# Patient Record
Sex: FEMALE | Race: WHITE | Employment: FULL TIME | URBAN - METROPOLITAN AREA
[De-identification: names, ages, dates, MRNs, and addresses within clinical notes are randomized per-mention and may not be internally consistent; named-entity substitution may affect disease eponyms.]

---

## 2016-12-08 LAB — STREP GP B CULT OB: NEGATIVE

## 2017-01-11 ENCOUNTER — TELEPHONE (OUTPATIENT)
Dept: OBGYN UNIT | Facility: HOSPITAL | Age: 32
End: 2017-01-11

## 2017-01-12 ENCOUNTER — APPOINTMENT (OUTPATIENT)
Dept: OBGYN CLINIC | Facility: HOSPITAL | Age: 32
End: 2017-01-12
Payer: COMMERCIAL

## 2017-01-12 ENCOUNTER — TELEPHONE (OUTPATIENT)
Dept: OBGYN UNIT | Facility: HOSPITAL | Age: 32
End: 2017-01-12

## 2017-01-13 PROBLEM — O48.0 POST-DATES PREGNANCY: Status: ACTIVE | Noted: 2017-01-13

## 2017-01-13 PROBLEM — O48.0 POST-DATES PREGNANCY (HCC): Status: ACTIVE | Noted: 2017-01-13

## 2017-01-18 ENCOUNTER — TELEPHONE (OUTPATIENT)
Dept: OBGYN UNIT | Facility: HOSPITAL | Age: 32
End: 2017-01-18

## 2017-01-19 ENCOUNTER — TELEPHONE (OUTPATIENT)
Dept: OBGYN UNIT | Facility: HOSPITAL | Age: 32
End: 2017-01-19

## 2017-01-19 ENCOUNTER — NURSE ONLY (OUTPATIENT)
Dept: LACTATION | Facility: HOSPITAL | Age: 32
End: 2017-01-19
Attending: OBSTETRICS & GYNECOLOGY
Payer: COMMERCIAL

## 2017-01-19 VITALS — TEMPERATURE: 98 F

## 2017-01-19 PROCEDURE — 99214 OFFICE O/P EST MOD 30 MIN: CPT

## 2017-01-19 NOTE — PROGRESS NOTES
LACTATION NOTE - MOTHER           Problems identified  Problems identified: Knowledge deficit;Milk supply WNL; Nipple pain  Problems Identified Other:  (EPDS score \"16\"  mood disorder protocol initiated, pt declined further assistances.  see below zoloft and xanax for depresion/anxiety managmement.  Information per Dr. Taurus South provided on both previously mentioned medications to discuss with pediatrician.)  Written Education: Breastfeeding suggestions for home;Engorgement/sore nipple prevention and amada

## 2017-01-19 NOTE — PROGRESS NOTES
REV'D SELF AND INFANT CARE WITH MOM. VERBALIZES UNDERSTANDING OF INSTRUCTIONS REV'D. ENCOURAGED TO FOLLOW-UP WITH MDS AS DIRECTED AND WITH QUESTIONS. PT REPORTS FEELING \"VERY EMOTIONAL AND WEEPY. \" DENIES ANY SUICIDAL OR HOMICIDAL THOUGHTS.  REV'D MENTAL

## 2017-12-22 ENCOUNTER — HOSPITAL (OUTPATIENT)
Dept: OTHER | Age: 32
End: 2017-12-22
Attending: EMERGENCY MEDICINE

## 2018-04-25 ENCOUNTER — HOSPITAL (OUTPATIENT)
Dept: OTHER | Age: 33
End: 2018-04-25
Attending: EMERGENCY MEDICINE

## 2019-02-09 ENCOUNTER — HOSPITAL (OUTPATIENT)
Dept: OTHER | Age: 34
End: 2019-02-09
Attending: EMERGENCY MEDICINE

## 2019-02-09 LAB
APPEARANCE UR: ABNORMAL
APPEARANCE UR: ABNORMAL
BILIRUB UR QL STRIP: NEGATIVE
BILIRUB UR QL STRIP: NEGATIVE
COLOR UR: YELLOW
COLOR UR: YELLOW
GLUCOSE UR STRIP-MCNC: NEGATIVE MG/DL
GLUCOSE UR STRIP-MCNC: NEGATIVE MG/DL
HCG POINT OF CARE (5HGRST): NEGATIVE
HCG POINT OF CARE (5HGRST): NEGATIVE
HEMOCCULT STL QL: NEGATIVE
HEMOCCULT STL QL: NEGATIVE
KETONES UR STRIP-MCNC: NEGATIVE MG/DL
KETONES UR STRIP-MCNC: NEGATIVE MG/DL
LEUKOCYTE ESTERASE UR QL STRIP: NEGATIVE
LEUKOCYTE ESTERASE UR QL STRIP: NEGATIVE
NITRITE UR QL STRIP: NEGATIVE
NITRITE UR QL STRIP: NEGATIVE
PH UR STRIP: 7 UNIT (ref 5–7)
PH UR STRIP: 7 UNITS (ref 5–7)
PROT UR STRIP-MCNC: ABNORMAL MG/DL
PROT UR STRIP-MCNC: ABNORMAL MG/DL
SP GR UR STRIP: 1.02 (ref 1–1.03)
SP GR UR STRIP: 1.02 (ref 1–1.03)
UROBILINOGEN UR STRIP-MCNC: 1 MG/DL (ref 0–1)
UROBILINOGEN UR STRIP-MCNC: 1 MG/DL (ref 0–1)

## 2020-01-28 ENCOUNTER — HOSPITAL (OUTPATIENT)
Dept: OTHER | Age: 35
End: 2020-01-28
Attending: EMERGENCY MEDICINE

## 2021-06-28 PROCEDURE — 88175 CYTOPATH C/V AUTO FLUID REDO: CPT | Performed by: OBSTETRICS & GYNECOLOGY

## 2021-06-28 PROCEDURE — 87624 HPV HI-RISK TYP POOLED RSLT: CPT | Performed by: OBSTETRICS & GYNECOLOGY

## 2021-08-13 ENCOUNTER — LAB ENCOUNTER (OUTPATIENT)
Dept: LAB | Age: 36
End: 2021-08-13
Attending: OBSTETRICS & GYNECOLOGY
Payer: COMMERCIAL

## 2021-08-13 DIAGNOSIS — Z13.220 SCREENING CHOLESTEROL LEVEL: ICD-10-CM

## 2021-08-13 LAB
CHOLEST SMN-MCNC: 176 MG/DL (ref ?–200)
HDLC SERPL-MCNC: 65 MG/DL (ref 40–59)
LDLC SERPL CALC-MCNC: 93 MG/DL (ref ?–100)
NONHDLC SERPL-MCNC: 111 MG/DL (ref ?–130)
PATIENT FASTING Y/N/NP: YES
TRIGL SERPL-MCNC: 103 MG/DL (ref 30–149)
VLDLC SERPL CALC-MCNC: 17 MG/DL (ref 0–30)

## 2021-08-13 PROCEDURE — 80061 LIPID PANEL: CPT

## 2021-08-13 PROCEDURE — 36415 COLL VENOUS BLD VENIPUNCTURE: CPT

## 2021-12-28 ENCOUNTER — WALK IN (OUTPATIENT)
Dept: URGENT CARE | Age: 36
End: 2021-12-28
Attending: EMERGENCY MEDICINE

## 2021-12-28 VITALS
SYSTOLIC BLOOD PRESSURE: 141 MMHG | DIASTOLIC BLOOD PRESSURE: 88 MMHG | TEMPERATURE: 97.1 F | WEIGHT: 265 LBS | HEART RATE: 88 BPM | RESPIRATION RATE: 14 BRPM | OXYGEN SATURATION: 97 %

## 2021-12-28 DIAGNOSIS — J01.01 ACUTE RECURRENT MAXILLARY SINUSITIS: Primary | ICD-10-CM

## 2021-12-28 PROCEDURE — C9803 HOPD COVID-19 SPEC COLLECT: HCPCS

## 2021-12-28 PROCEDURE — U0003 INFECTIOUS AGENT DETECTION BY NUCLEIC ACID (DNA OR RNA); SEVERE ACUTE RESPIRATORY SYNDROME CORONAVIRUS 2 (SARS-COV-2) (CORONAVIRUS DISEASE [COVID-19]), AMPLIFIED PROBE TECHNIQUE, MAKING USE OF HIGH THROUGHPUT TECHNOLOGIES AS DESCRIBED BY CMS-2020-01-R: HCPCS | Performed by: EMERGENCY MEDICINE

## 2021-12-28 PROCEDURE — 99202 OFFICE O/P NEW SF 15 MIN: CPT

## 2021-12-28 RX ORDER — FLUTICASONE PROPIONATE 50 MCG
1 SPRAY, SUSPENSION (ML) NASAL 2 TIMES DAILY
Qty: 16 G | Refills: 12 | Status: SHIPPED | OUTPATIENT
Start: 2021-12-28 | End: 2023-10-11 | Stop reason: ALTCHOICE

## 2021-12-28 ASSESSMENT — PAIN SCALES - GENERAL: PAINLEVEL: 5

## 2021-12-29 LAB
SARS-COV-2 RNA RESP QL NAA+PROBE: NOT DETECTED
SERVICE CMNT-IMP: NORMAL
SERVICE CMNT-IMP: NORMAL

## 2023-05-04 ENCOUNTER — WALK IN (OUTPATIENT)
Dept: URGENT CARE | Age: 38
End: 2023-05-04
Attending: EMERGENCY MEDICINE

## 2023-05-04 VITALS
OXYGEN SATURATION: 98 % | RESPIRATION RATE: 16 BRPM | SYSTOLIC BLOOD PRESSURE: 139 MMHG | TEMPERATURE: 97.9 F | DIASTOLIC BLOOD PRESSURE: 80 MMHG | HEART RATE: 80 BPM

## 2023-05-04 DIAGNOSIS — H66.91 RIGHT OTITIS MEDIA, UNSPECIFIED OTITIS MEDIA TYPE: ICD-10-CM

## 2023-05-04 DIAGNOSIS — K12.2 UVULITIS: Primary | ICD-10-CM

## 2023-05-04 PROCEDURE — 99212 OFFICE O/P EST SF 10 MIN: CPT

## 2023-05-04 RX ORDER — SERTRALINE HYDROCHLORIDE 25 MG/1
25 TABLET, FILM COATED ORAL DAILY
COMMUNITY

## 2023-05-04 RX ORDER — PREDNISONE 20 MG/1
40 TABLET ORAL DAILY
Qty: 10 TABLET | Refills: 0 | Status: SHIPPED | OUTPATIENT
Start: 2023-05-04 | End: 2023-05-09

## 2023-05-04 RX ORDER — AZITHROMYCIN 250 MG/1
TABLET, FILM COATED ORAL
Qty: 6 TABLET | Refills: 0 | Status: SHIPPED | OUTPATIENT
Start: 2023-05-04 | End: 2023-10-11 | Stop reason: ALTCHOICE

## 2023-05-04 RX ORDER — BUPROPION HYDROCHLORIDE 75 MG/1
75 TABLET ORAL 2 TIMES DAILY
COMMUNITY

## 2023-05-04 ASSESSMENT — ENCOUNTER SYMPTOMS
ACTIVITY CHANGE: 0
POLYPHAGIA: 0
POLYDIPSIA: 0
FEVER: 0
HEADACHES: 0
NUMBNESS: 0
SORE THROAT: 0
WOUND: 0
VOMITING: 0
COUGH: 0
EYE DISCHARGE: 0
BRUISES/BLEEDS EASILY: 0
SHORTNESS OF BREATH: 0
CHILLS: 0
EYE PAIN: 0
FACIAL SWELLING: 0
NAUSEA: 0
DIARRHEA: 0
COLOR CHANGE: 0
CONFUSION: 0
BACK PAIN: 0
DIZZINESS: 0
WHEEZING: 0
ABDOMINAL PAIN: 0
EYE REDNESS: 0

## 2023-10-11 ENCOUNTER — WALK IN (OUTPATIENT)
Dept: URGENT CARE | Age: 38
End: 2023-10-11
Attending: EMERGENCY MEDICINE

## 2023-10-11 VITALS
WEIGHT: 260 LBS | SYSTOLIC BLOOD PRESSURE: 153 MMHG | OXYGEN SATURATION: 97 % | HEART RATE: 80 BPM | TEMPERATURE: 98.3 F | DIASTOLIC BLOOD PRESSURE: 80 MMHG | RESPIRATION RATE: 16 BRPM

## 2023-10-11 DIAGNOSIS — J01.90 ACUTE NON-RECURRENT SINUSITIS, UNSPECIFIED LOCATION: Primary | ICD-10-CM

## 2023-10-11 DIAGNOSIS — U07.1 COVID-19 VIRUS INFECTION: ICD-10-CM

## 2023-10-11 PROCEDURE — 99212 OFFICE O/P EST SF 10 MIN: CPT

## 2023-10-11 RX ORDER — AZITHROMYCIN 250 MG/1
TABLET, FILM COATED ORAL
Qty: 6 TABLET | Refills: 0 | Status: SHIPPED | OUTPATIENT
Start: 2023-10-11

## 2023-10-11 RX ORDER — FLUTICASONE PROPIONATE 50 MCG
1 SPRAY, SUSPENSION (ML) NASAL DAILY
Qty: 16 G | Refills: 0 | Status: SHIPPED | OUTPATIENT
Start: 2023-10-11 | End: 2023-11-10

## 2023-10-11 ASSESSMENT — ENCOUNTER SYMPTOMS
WHEEZING: 0
EYE PAIN: 0
SHORTNESS OF BREATH: 0
SINUS PAIN: 1
COLOR CHANGE: 0
NUMBNESS: 0
HEADACHES: 0
EYE REDNESS: 0
EYE DISCHARGE: 0
DIARRHEA: 0
SINUS PRESSURE: 1
WOUND: 0
POLYPHAGIA: 0
BRUISES/BLEEDS EASILY: 0
BACK PAIN: 0
SORE THROAT: 0
ABDOMINAL PAIN: 0
DIZZINESS: 0
COUGH: 1
NAUSEA: 0
CHILLS: 0
VOMITING: 0
POLYDIPSIA: 0
FACIAL SWELLING: 0
ACTIVITY CHANGE: 0
FEVER: 1
CONFUSION: 0

## 2023-10-11 ASSESSMENT — PAIN SCALES - GENERAL
PAINLEVEL_OUTOF10: 5
PAINLEVEL: 5

## 2023-11-22 PROCEDURE — 88175 CYTOPATH C/V AUTO FLUID REDO: CPT

## 2023-11-22 PROCEDURE — 87624 HPV HI-RISK TYP POOLED RSLT: CPT

## 2023-12-20 ENCOUNTER — LAB ENCOUNTER (OUTPATIENT)
Dept: LAB | Facility: HOSPITAL | Age: 38
End: 2023-12-20
Payer: COMMERCIAL

## 2023-12-20 DIAGNOSIS — Z01.818 PRE-OP TESTING: ICD-10-CM

## 2023-12-20 PROCEDURE — 80053 COMPREHEN METABOLIC PANEL: CPT

## 2023-12-20 PROCEDURE — 82306 VITAMIN D 25 HYDROXY: CPT

## 2023-12-20 PROCEDURE — 36415 COLL VENOUS BLD VENIPUNCTURE: CPT

## 2023-12-20 PROCEDURE — 80061 LIPID PANEL: CPT

## 2023-12-20 PROCEDURE — 84443 ASSAY THYROID STIM HORMONE: CPT

## 2023-12-20 PROCEDURE — 83036 HEMOGLOBIN GLYCOSYLATED A1C: CPT

## 2023-12-20 PROCEDURE — 85025 COMPLETE CBC W/AUTO DIFF WBC: CPT

## 2023-12-27 ENCOUNTER — HOSPITAL ENCOUNTER (OUTPATIENT)
Dept: MAMMOGRAPHY | Age: 38
Discharge: HOME | End: 2023-12-27
Payer: COMMERCIAL

## 2023-12-27 DIAGNOSIS — Z12.31 ENCOUNTER FOR MAMMOGRAM TO ESTABLISH BASELINE MAMMOGRAM: ICD-10-CM

## 2023-12-27 PROCEDURE — 77067 SCR MAMMO BI INCL CAD: CPT

## 2023-12-27 PROCEDURE — 77063 BREAST TOMOSYNTHESIS BI: CPT

## 2024-01-05 ENCOUNTER — HOSPITAL ENCOUNTER (OUTPATIENT)
Facility: HOSPITAL | Age: 39
Setting detail: HOSPITAL OUTPATIENT SURGERY
Discharge: HOME | End: 2024-01-05
Attending: OBSTETRICS & GYNECOLOGY | Admitting: OBSTETRICS & GYNECOLOGY
Payer: COMMERCIAL

## 2024-01-05 ENCOUNTER — ANESTHESIA (OUTPATIENT)
Dept: SURGERY | Facility: HOSPITAL | Age: 39
End: 2024-01-05
Payer: COMMERCIAL

## 2024-01-05 ENCOUNTER — ANESTHESIA EVENT (OUTPATIENT)
Dept: SURGERY | Facility: HOSPITAL | Age: 39
End: 2024-01-05
Payer: COMMERCIAL

## 2024-01-05 VITALS
HEART RATE: 62 BPM | WEIGHT: 282.19 LBS | OXYGEN SATURATION: 94 % | BODY MASS INDEX: 44.82 KG/M2 | DIASTOLIC BLOOD PRESSURE: 76 MMHG | HEIGHT: 66.5 IN | SYSTOLIC BLOOD PRESSURE: 122 MMHG | TEMPERATURE: 98 F | RESPIRATION RATE: 16 BRPM

## 2024-01-05 DIAGNOSIS — Z01.818 PRE-OP TESTING: Primary | ICD-10-CM

## 2024-01-05 PROBLEM — Q51.3 BICORNATE UTERUS: Status: ACTIVE | Noted: 2024-01-05

## 2024-01-05 PROBLEM — Z98.890 STATUS POST HYSTEROSCOPY: Status: ACTIVE | Noted: 2024-01-05

## 2024-01-05 LAB — B-HCG UR QL: NEGATIVE

## 2024-01-05 PROCEDURE — 0UB98ZZ EXCISION OF UTERUS, VIA NATURAL OR ARTIFICIAL OPENING ENDOSCOPIC: ICD-10-PCS | Performed by: OBSTETRICS & GYNECOLOGY

## 2024-01-05 PROCEDURE — 81025 URINE PREGNANCY TEST: CPT

## 2024-01-05 PROCEDURE — 88305 TISSUE EXAM BY PATHOLOGIST: CPT | Performed by: OBSTETRICS & GYNECOLOGY

## 2024-01-05 PROCEDURE — 0UBC8ZZ EXCISION OF CERVIX, VIA NATURAL OR ARTIFICIAL OPENING ENDOSCOPIC: ICD-10-PCS | Performed by: OBSTETRICS & GYNECOLOGY

## 2024-01-05 RX ORDER — ONDANSETRON 2 MG/ML
INJECTION INTRAMUSCULAR; INTRAVENOUS AS NEEDED
Status: DISCONTINUED | OUTPATIENT
Start: 2024-01-05 | End: 2024-01-05 | Stop reason: SURG

## 2024-01-05 RX ORDER — HYDROMORPHONE HYDROCHLORIDE 1 MG/ML
0.4 INJECTION, SOLUTION INTRAMUSCULAR; INTRAVENOUS; SUBCUTANEOUS EVERY 5 MIN PRN
Status: DISCONTINUED | OUTPATIENT
Start: 2024-01-05 | End: 2024-01-05

## 2024-01-05 RX ORDER — ONDANSETRON 2 MG/ML
4 INJECTION INTRAMUSCULAR; INTRAVENOUS EVERY 6 HOURS PRN
Status: DISCONTINUED | OUTPATIENT
Start: 2024-01-05 | End: 2024-01-05

## 2024-01-05 RX ORDER — ROPIVACAINE HYDROCHLORIDE 5 MG/ML
INJECTION, SOLUTION EPIDURAL; INFILTRATION; PERINEURAL AS NEEDED
Status: DISCONTINUED | OUTPATIENT
Start: 2024-01-05 | End: 2024-01-05 | Stop reason: HOSPADM

## 2024-01-05 RX ORDER — HYDROCODONE BITARTRATE AND ACETAMINOPHEN 5; 325 MG/1; MG/1
1 TABLET ORAL ONCE AS NEEDED
Status: DISCONTINUED | OUTPATIENT
Start: 2024-01-05 | End: 2024-01-05

## 2024-01-05 RX ORDER — IBUPROFEN 600 MG/1
600 TABLET ORAL EVERY 6 HOURS PRN
Qty: 20 TABLET | Refills: 1 | Status: SHIPPED | OUTPATIENT
Start: 2024-01-05

## 2024-01-05 RX ORDER — SODIUM CHLORIDE, SODIUM LACTATE, POTASSIUM CHLORIDE, CALCIUM CHLORIDE 600; 310; 30; 20 MG/100ML; MG/100ML; MG/100ML; MG/100ML
INJECTION, SOLUTION INTRAVENOUS CONTINUOUS
Status: DISCONTINUED | OUTPATIENT
Start: 2024-01-05 | End: 2024-01-05

## 2024-01-05 RX ORDER — KETOROLAC TROMETHAMINE 30 MG/ML
INJECTION, SOLUTION INTRAMUSCULAR; INTRAVENOUS AS NEEDED
Status: DISCONTINUED | OUTPATIENT
Start: 2024-01-05 | End: 2024-01-05 | Stop reason: SURG

## 2024-01-05 RX ORDER — ACETAMINOPHEN 500 MG
1000 TABLET ORAL ONCE AS NEEDED
Status: DISCONTINUED | OUTPATIENT
Start: 2024-01-05 | End: 2024-01-05

## 2024-01-05 RX ORDER — LIDOCAINE HYDROCHLORIDE 10 MG/ML
INJECTION, SOLUTION EPIDURAL; INFILTRATION; INTRACAUDAL; PERINEURAL AS NEEDED
Status: DISCONTINUED | OUTPATIENT
Start: 2024-01-05 | End: 2024-01-05 | Stop reason: SURG

## 2024-01-05 RX ORDER — HYDROMORPHONE HYDROCHLORIDE 1 MG/ML
0.2 INJECTION, SOLUTION INTRAMUSCULAR; INTRAVENOUS; SUBCUTANEOUS EVERY 5 MIN PRN
Status: DISCONTINUED | OUTPATIENT
Start: 2024-01-05 | End: 2024-01-05

## 2024-01-05 RX ORDER — DEXAMETHASONE SODIUM PHOSPHATE 4 MG/ML
VIAL (ML) INJECTION AS NEEDED
Status: DISCONTINUED | OUTPATIENT
Start: 2024-01-05 | End: 2024-01-05 | Stop reason: SURG

## 2024-01-05 RX ORDER — MEPERIDINE HYDROCHLORIDE 25 MG/ML
12.5 INJECTION INTRAMUSCULAR; INTRAVENOUS; SUBCUTANEOUS AS NEEDED
Status: DISCONTINUED | OUTPATIENT
Start: 2024-01-05 | End: 2024-01-05

## 2024-01-05 RX ORDER — ACETAMINOPHEN 500 MG
1000 TABLET ORAL ONCE
Status: DISCONTINUED | OUTPATIENT
Start: 2024-01-05 | End: 2024-01-05 | Stop reason: HOSPADM

## 2024-01-05 RX ORDER — MIDAZOLAM HYDROCHLORIDE 1 MG/ML
1 INJECTION INTRAMUSCULAR; INTRAVENOUS EVERY 5 MIN PRN
Status: DISCONTINUED | OUTPATIENT
Start: 2024-01-05 | End: 2024-01-05

## 2024-01-05 RX ORDER — PROCHLORPERAZINE EDISYLATE 5 MG/ML
5 INJECTION INTRAMUSCULAR; INTRAVENOUS EVERY 8 HOURS PRN
Status: DISCONTINUED | OUTPATIENT
Start: 2024-01-05 | End: 2024-01-05

## 2024-01-05 RX ORDER — VASOPRESSIN 20 U/ML
INJECTION PARENTERAL AS NEEDED
Status: DISCONTINUED | OUTPATIENT
Start: 2024-01-05 | End: 2024-01-05 | Stop reason: HOSPADM

## 2024-01-05 RX ORDER — HYDROMORPHONE HYDROCHLORIDE 1 MG/ML
0.6 INJECTION, SOLUTION INTRAMUSCULAR; INTRAVENOUS; SUBCUTANEOUS EVERY 5 MIN PRN
Status: DISCONTINUED | OUTPATIENT
Start: 2024-01-05 | End: 2024-01-05

## 2024-01-05 RX ORDER — DIPHENHYDRAMINE HYDROCHLORIDE 50 MG/ML
12.5 INJECTION INTRAMUSCULAR; INTRAVENOUS AS NEEDED
Status: DISCONTINUED | OUTPATIENT
Start: 2024-01-05 | End: 2024-01-05

## 2024-01-05 RX ORDER — NALOXONE HYDROCHLORIDE 0.4 MG/ML
0.08 INJECTION, SOLUTION INTRAMUSCULAR; INTRAVENOUS; SUBCUTANEOUS AS NEEDED
Status: DISCONTINUED | OUTPATIENT
Start: 2024-01-05 | End: 2024-01-05

## 2024-01-05 RX ORDER — HYDROCODONE BITARTRATE AND ACETAMINOPHEN 5; 325 MG/1; MG/1
2 TABLET ORAL ONCE AS NEEDED
Status: DISCONTINUED | OUTPATIENT
Start: 2024-01-05 | End: 2024-01-05

## 2024-01-05 RX ADMIN — ONDANSETRON 4 MG: 2 INJECTION INTRAMUSCULAR; INTRAVENOUS at 13:15:00

## 2024-01-05 RX ADMIN — SODIUM CHLORIDE, SODIUM LACTATE, POTASSIUM CHLORIDE, CALCIUM CHLORIDE: 600; 310; 30; 20 INJECTION, SOLUTION INTRAVENOUS at 13:28:00

## 2024-01-05 RX ADMIN — DEXAMETHASONE SODIUM PHOSPHATE 8 MG: 4 MG/ML VIAL (ML) INJECTION at 13:15:00

## 2024-01-05 RX ADMIN — KETOROLAC TROMETHAMINE 30 MG: 30 INJECTION, SOLUTION INTRAMUSCULAR; INTRAVENOUS at 13:23:00

## 2024-01-05 RX ADMIN — LIDOCAINE HYDROCHLORIDE 50 MG: 10 INJECTION, SOLUTION EPIDURAL; INFILTRATION; INTRACAUDAL; PERINEURAL at 13:10:00

## 2024-01-05 RX ADMIN — SODIUM CHLORIDE, SODIUM LACTATE, POTASSIUM CHLORIDE, CALCIUM CHLORIDE: 600; 310; 30; 20 INJECTION, SOLUTION INTRAVENOUS at 13:06:00

## 2024-01-05 NOTE — ANESTHESIA PROCEDURE NOTES
Airway  Date/Time: 1/5/2024 1:12 PM  Urgency: elective      General Information and Staff    Patient location during procedure: OR  Anesthesiologist: Fina Gold MD  Resident/CRNA: Laci Weeks CRNA  Performed: CRNA   Performed by: Laci Weeks CRNA  Authorized by: Fina Gold MD      Indications and Patient Condition  Indications for airway management: anesthesia  Sedation level: deep  Preoxygenated: yes  Patient position: sniffing  Mask difficulty assessment: 0 - not attempted    Final Airway Details  Final airway type: supraglottic airway      Successful airway: classic  Size 3       Number of attempts at approach: 1

## 2024-01-05 NOTE — DISCHARGE SUMMARY
[facility]  Discharge Summary    Sonja Nieves Patient Status:  Hospital Outpatient Surgery    4/3/1985 MRN [id number]   Location [facility] SURGERY Attending David Sierra MD   Hosp Day # 0 PCP No primary care provider on file.     Date of Admission: 2024    Date of Discharge: 2024    Admitting Diagnosis: endometrial and cervical polyps    Discharge Diagnosis:   Patient Active Problem List   Diagnosis    Pregnancy    History of  delivery, currently pregnant    BMI 39.0-39.9,adult    Obesity complicating pregnancy    Contact with or exposure to viral disease    Post-dates pregnancy    Status post hysteroscopy -removal of endocervical polyp and endometrial polyp     Bicornate uterus       Reason for Admission: See History and Physical    Hospital Course: uncomplicated    Consultations: none    Procedures: hysteroscopy D&C - cervical polypectomy and endometrial polyp removal    Complications: none    Disposition: Home or Self Care    Discharge Condition: Good    Discharge Medications:   Current Discharge Medication List        START taking these medications    Details   ibuprofen 600 MG Oral Tab Take 1 tablet (600 mg total) by mouth every 6 (six) hours as needed.  Qty: 20 tablet, Refills: 1           CONTINUE these medications which have NOT CHANGED    Details   Cholecalciferol (VITAMIN D3) 1.25 MG (45585 UT) Oral Cap Take 1 capsule by mouth ONCE per week for 12 weeks  Qty: 12 capsule, Refills: 0             Diet:  General    Activity:  Pelvic Rest and  routine post. Operative precautions    Motrin and Tylenol    Follow up Visits: Follow-up in 2 weeks      Other Discharge Instructions: Pelvic Rest, no heavy lifting    David Sierra MD  2024  1:44 PM

## 2024-01-05 NOTE — OPERATIVE REPORT
Operative Report      Preoperative Diagnosis:   Endometrial Polyp                                           Cervical Polyp  Postoperative Diagnosis: Same      Operation:  Operative Hysteroscopy - polypectomy          Dilatation and Curettage                     Endocervical polypectomy    Surgeon:  Rigo           Anesthesia: MAC and local    EBL: 5 ml's  Urine:  30 ml's  Fluid deficits:  110 ml's    Indications:  see H&P    Surgical findings:  AVAF uterus with a bicornate fundus. Large endocervical polyp prolapsing out of cervix - removed with polyp forceps (2.5 cm's). Cavity with a polyp on the right anterior wall mid-uterus.  This was easily removed with the curettings.        Procedure:    The patient was prepped and draped in the usual sterile fashion.  The bladder was emptied with a straight catheter.  Exam under anesthesia was performed.   A weighted speculum was placed in the vagina and the cervix was grasped with a single toothed tenaculum. Local anesthetic was placed in the paracervical region using rupivocaine with epinephrin.   The large polyp was removed in a twisting fashion before the scope.  The cervix required no dilatation due to the polyp.  A  5 mm hysteroscope was inserted and a second single tooth tenaculum was used to clamp the cervix down around the scope.  Inflow was via 150 mm Hg pressure bag using normal saline as a distention medium.  The polyp was identified.   A sharp curettage was then performed with moderate amount returns and the polyp.  Repeat diagnostic hysteroscopy confirmed complete removal of polyp. Total scope time was less than 5 minutes and the fluid deficits were minimal.  The tissue was sent to pathology for evaluation with the polyps included.  Fluids and instruments were removed from the vagina.  There were no complications.  The sponge and instrument counts were reported to me as correct.  The patient was stable to the recovery room.    Specimen:  Endocervical  polyp and  endometrial curette with polyp    Complications: None    David Sierra MD  1/5/2024  1:37 PM

## 2024-01-05 NOTE — ANESTHESIA PREPROCEDURE EVALUATION
PRE-OP EVALUATION    Patient Name: Sonja Nieves    Admit Diagnosis: endometrial and cervical polyps    Pre-op Diagnosis: endometrial and cervical polyps    OPERATIVE HYSTEROSCOPY, POLYPECTOMIES,  DILATION AND CURETTAGE    Anesthesia Procedure: OPERATIVE HYSTEROSCOPY, POLYPECTOMIES,  DILATION AND CURETTAGE    Surgeon(s) and Role:     * David Sierra MD - Primary    Pre-op vitals reviewed.        Body mass index is 45.15 kg/m².    Current medications reviewed.  Hospital Medications:   acetaminophen (Tylenol Extra Strength) tab 1,000 mg  1,000 mg Oral Once    lactated ringers infusion   Intravenous Continuous       Outpatient Medications:     Medications Prior to Admission   Medication Sig Dispense Refill Last Dose    Cholecalciferol (VITAMIN D3) 1.25 MG (65741 UT) Oral Cap Take 1 capsule by mouth ONCE per week for 12 weeks 12 capsule 0        Allergies: Adhesive tape and Pcn [penicillins]      Anesthesia Evaluation    Patient summary reviewed.    Anesthetic Complications  (-) history of anesthetic complications         GI/Hepatic/Renal    Negative GI/hepatic/renal ROS.                             Cardiovascular        Exercise tolerance: good     MET: >4    (+) obesity                                       Endo/Other    Negative endo/other ROS.                              Pulmonary    Negative pulmonary ROS.                       Neuro/Psych    Negative neuro/psych ROS.                                  Past Surgical History:   Procedure Laterality Date    CHOLECYSTECTOMY  04/2015    CYST REMOVAL Right 11/2010    D & C  12/2014     Social History     Socioeconomic History    Marital status:    Tobacco Use    Smoking status: Never    Smokeless tobacco: Never   Vaping Use    Vaping Use: Never used   Substance and Sexual Activity    Alcohol use: Yes    Drug use: No    Sexual activity: Yes     Partners: Male     Birth control/protection: Condom     History   Drug Use No     Available pre-op labs  reviewed.  Lab Results   Component Value Date    WBC 8.8 12/20/2023    RBC 4.62 12/20/2023    HGB 12.6 12/20/2023    HCT 37.4 12/20/2023    MCV 81.0 12/20/2023    MCH 27.3 12/20/2023    MCHC 33.7 12/20/2023    RDW 14.2 12/20/2023    .0 12/20/2023     Lab Results   Component Value Date     12/20/2023    K 3.8 12/20/2023     12/20/2023    CO2 30.0 12/20/2023    BUN 12 12/20/2023    CREATSERUM 1.13 (H) 12/20/2023     (H) 12/20/2023    CA 8.9 12/20/2023            Airway      Mallampati: III  Mouth opening: >3 FB  TM distance: > 6 cm   Cardiovascular    Cardiovascular exam normal.         Dental             Pulmonary    Pulmonary exam normal.                 Other findings              ASA: 3   Plan: general  NPO status verified and patient meets guidelines.          Plan/risks discussed with: patient                Present on Admission:  **None**

## 2024-01-05 NOTE — H&P
[facility]  History & Physical    Sonja Nieves Patient Status:  Hospital Outpatient Surgery    4/3/1985 MRN [id number]   Location [facility] SURGERY Attending David Sierra MD   Hosp Day # 0 PCP No primary care provider on file.     SUBJECTIVE:    Reason for Admission:  Endometrial polyp  Endocervical polyp  History of Present Illness:  Patient is a(n) 38 year old female [id number]'s last menstrual period was 2023 (approximate). (contraception: barrier)  found to have a cervical polyp on WWE.  Ultrasound found a polyp in cavity as well.  Here to have both removed. No symptoms.       Medications:  No medications prior to admission.       Allergies:  Allergies   Allergen Reactions    Adhesive Tape HIVES    Pcn [Penicillins] ANAPHYLAXIS        Past Medical History:  Past Medical History:   Diagnosis Date    Gallbladder calculus     Mental disorder     anxiety    Visual impairment        Past Surgical History:  Past Surgical History:   Procedure Laterality Date    CHOLECYSTECTOMY  2015    CYST REMOVAL Right 2010    D & C  2014       Past OB History:  OB History    Para Term  AB Living   3 2 1 1 1 1   SAB IAB Ectopic Multiple Live Births   1     0 1      # Outcome Date GA Lbr Zach/2nd Weight Sex Delivery Anes PTL Lv   3 Term 17 41w3d 03:20 / 01:50 10 lb 3.1 oz F NORMAL SPONT EPI N KATI      Complications: Variable decelerations   2  08/20/15 22w3d 01:55 / 00:26 1 lb 5.3 oz  NORMAL SPONT None Y FD      Complications:  labor   1 SAB                Past GYN History:   Menarche: 12 (2023  8:25 AM)  Period Cycle (Days): 28-30 (2023  8:25 AM)  Period Duration (Days): 7 (2023  8:25 AM)  Period Flow: heavy days 2-4 (2023  8:25 AM)  Use of Birth Control (if yes, specify type): Condoms (2023  8:25 AM)  Pap Date: 21 (2023  8:25 AM)  Pap Result Notes: NEG/NEG (2023  8:25 AM)  Follow Up Recommendation:   (11/22/2023  8:25 AM)          Social History:  Social History     Tobacco Use    Smoking status: Never    Smokeless tobacco: Never   Substance Use Topics    Alcohol use: Yes        Family History:  Family History   Problem Relation Age of Onset    Diabetes Father     Hypertension Father     Thyroid disease Father     Other (lung cancer) Father     No Known Problems Mother     Cancer Maternal Grandmother         tongue,throat    Stroke Maternal Grandmother     Diabetes Paternal Grandfather        Review of Systems:  Non-contributory    OBJECTIVE:    Height 66.5\", weight 284 lb, last menstrual period 11/26/2023, currently breastfeeding.  No intake or output data in the 24 hours ending 01/05/24 1118    Physical Exam:  General: Alert, orientated x3. .  Vital Signs:  Height 66.5\", weight 284 lb, last menstrual period 11/26/2023, currently breastfeeding.. VSS Afebrile  HEENT: Exam is unremarkable.  Without scleral icterus.  Mucous membranes are moist. Pupils are equal and round, reactive to light and accommodate.  Oropharynx is clear.  Neck: No tenderness to palpitation.  Full range of motion to flexion and extension, lateral rotation and lateral flexion of cervical spine.  No JVD. Supple.   Lungs: Clear to auscultation bilaterally.  Cardiac: Regular rate and rhythm. No murmur.  Abdomen:  Soft, non-distended, non-tender. Benign without masses or peritoneal signs.   Pelvic:    11/22/23                  External Genitalia: Normal appearing, no lesions.    Vagina: normal pink mucosa, no lesions, normal clear discharge.    Bladder well supported.  No  anterior or posterior hernias    Cervix: multiparous, no lesions , No CMT, +0.5 cm cervical polyp with contact bleed     Uterus: AVAF, mobile, non tender, normal size    Adnexa: non tender, no masses, normal size  Extremities:  No lower extremity edema noted.  Without clubbing or cyanosis.    Skin: Normal texture         Diagnostics:    Pelvic ultrasound done for endocervical  polyp to rule out endometrial polyps done on 2023.     Findings: Anteverted anteflexed uterus measuring 9.8 x 8.1 x 5.6 cm with a 12 mm endometrial stripe there is a questionable polypoid-like structure within the middle of the uterus measuring 1.8 x 0.8 cm.  Right ovary measures 1.9 x 2.2 x 2.6 cm and is normal.  The left ovary measures 2.4 x 2.8 x 2.5 cm and is also normal.  No free fluid in the cul-de-sac.     Impression: Findings consistent with an endometrial polyp.       ASSESSMENT:  Endometrial Polyp  Cervical polyp    \  Patient Active Problem List   Diagnosis    Pregnancy    History of  delivery, currently pregnant    BMI 39.0-39.9,adult    Obesity complicating pregnancy    Contact with or exposure to viral disease    Post-dates pregnancy           PLAN:    Polypectomy from cervix then HS - polypectomy / D&C    All of the findings and plan were discussed with the patient.  She notes understanding and agrees with the plan of care. She understands the risks and complications of the procedure; including but not limited to bleeding, infection, trauma to GI and  tracts.   All questions were answered.    David Sierra MD  2024  11:18 AM

## 2024-01-05 NOTE — ANESTHESIA POSTPROCEDURE EVALUATION
[facility]    Sonja Nieves Patient Status:  Hospital Outpatient Surgery   Age/Gender 38 year old female MRN [id number]   Location [facility] SURGERY Attending David Sierra MD   Hosp Day # 0 PCP No primary care provider on file.       Anesthesia Post-op Note    OPERATIVE HYSTEROSCOPY, POLYPECTOMIES,  DILATION AND CURETTAGE    Procedure Summary       Date: 01/05/24 Room / Location:  MAIN OR 03 / [facility] MAIN OR    Anesthesia Start: 1305 Anesthesia Stop: 1340    Procedure: OPERATIVE HYSTEROSCOPY, POLYPECTOMIES,  DILATION AND CURETTAGE (Vagina ) Diagnosis: (endometrial and cervical polyps)    Surgeons: David Sierra MD Anesthesiologist: Fina Gold MD    Anesthesia Type: general ASA Status: 3            Anesthesia Type: No value filed.    Vitals Value Taken Time   /70 01/05/24 1345   Temp 97.8 01/05/24 1345   Pulse 75 01/05/24 1345   Resp 20 01/05/24 1345   SpO2 97% 01/05/24 1345       Patient Location: Same Day Surgery    Anesthesia Type: general    Airway Patency: patent    Postop Pain Control: adequate    Mental Status: mildly sedated but able to meaningfully participate in the post-anesthesia evaluation    Nausea/Vomiting: none    Cardiopulmonary/Hydration status: stable euvolemic    Complications: no apparent anesthesia related complications    Postop vital signs: stable    Dental Exam: Unchanged from Preop    Patient to be discharged home when criteria met.

## 2024-01-05 NOTE — DISCHARGE INSTRUCTIONS
Home Care Instructions  Dilatation and Curettage (D&C)    Surgeon:  David Sierra MD    Dilation of the cervix (the opening to the uterus) and scraping of the endometrial lining of the uterus is used for diagnostic and/or therapeutic purposes.    Alternate Motrin 600 mg's with Tylenol 1,000 mg's every 3 hours.          IMPORTANT POINTS TO KNOW    You may experience a scratchy throat from the breathing tube used during general anesthesia.  You may resume your regular diet as tolerated.  Minimal activity is preferred for 2-3 days.  It is important to wipe from front to back after elimination.  No sexual intercourse, douching or use of tampons for 2 weeks.  Vaginal bleeding or spotting may continue for a week after the procedure.  Mild cramping may continue for 2-3 days after the procedure.  It is alright to take extra strength acetaminophen (Tylenol) or ibuprofen (Motrin/Advil) to help relieve the cramping.  Return to the clinic for your follow up appointment as instructed by physician.      NOTIFY THE OFFICE IF:    Vaginal bleeding that is heavier than a menstrual period.  Vaginal discharge that burns, irritated, or has a foul odor.  Chill or fever over 101 degrees F.  You develop severe lower abdominal cramps or pain.  You develop frequency, urgency and/or burning with urination.      NOTE:  The above listed information is meant to be a guide only.  If you have any problems or questions not answered on the sheet, please call the office.

## 2024-01-15 ENCOUNTER — HOSPITAL ENCOUNTER (OUTPATIENT)
Dept: MAMMOGRAPHY | Facility: HOSPITAL | Age: 39
Discharge: HOME | End: 2024-01-15
Payer: COMMERCIAL

## 2024-01-15 DIAGNOSIS — R92.2 INCONCLUSIVE MAMMOGRAM: ICD-10-CM

## 2024-01-15 PROCEDURE — 77066 DX MAMMO INCL CAD BI: CPT

## 2024-01-15 PROCEDURE — 77062 BREAST TOMOSYNTHESIS BI: CPT

## 2024-01-15 PROCEDURE — 76642 ULTRASOUND BREAST LIMITED: CPT

## 2024-05-02 ENCOUNTER — OFFICE VISIT (OUTPATIENT)
Facility: CLINIC | Age: 39
End: 2024-05-02
Payer: COMMERCIAL

## 2024-05-02 VITALS
SYSTOLIC BLOOD PRESSURE: 114 MMHG | WEIGHT: 255 LBS | HEIGHT: 66.5 IN | DIASTOLIC BLOOD PRESSURE: 76 MMHG | BODY MASS INDEX: 40.5 KG/M2

## 2024-05-02 DIAGNOSIS — Z87.42 HX OF MENORRHAGIA: ICD-10-CM

## 2024-05-02 DIAGNOSIS — N39.3 SUI (STRESS URINARY INCONTINENCE, FEMALE): ICD-10-CM

## 2024-05-02 DIAGNOSIS — Z98.890 STATUS POST HYSTEROSCOPY: Primary | ICD-10-CM

## 2024-05-02 PROBLEM — N84.1 CERVICAL POLYP: Status: ACTIVE | Noted: 2024-05-02

## 2024-05-02 PROCEDURE — 3008F BODY MASS INDEX DOCD: CPT | Performed by: OBSTETRICS & GYNECOLOGY

## 2024-05-02 PROCEDURE — 3078F DIAST BP <80 MM HG: CPT | Performed by: OBSTETRICS & GYNECOLOGY

## 2024-05-02 PROCEDURE — 3074F SYST BP LT 130 MM HG: CPT | Performed by: OBSTETRICS & GYNECOLOGY

## 2024-05-02 PROCEDURE — 99213 OFFICE O/P EST LOW 20 MIN: CPT | Performed by: OBSTETRICS & GYNECOLOGY

## 2024-05-02 NOTE — PROGRESS NOTES
Gynecology Office Visit      Sonja Nieves is a 39 year old female  Patient's last menstrual period was 04/15/2024 (approximate). (contraception:  barrier)     HPI:     Chief Complaint   Patient presents with    Other     Fu for hysteroscopy. States period in march was horrible, co nausea, heavy period congestion. States she feels better now.      Found on WWE - with a heavy bleeding complaint    Normal light periods in Feb and  very heavy but had a viral syndrome during    Willing to watch for now    #2 - worsening SHAQUILLE - discussed sling, start with Pelvic floor PT.    Hx of polypectomies endocervical and endometrium, HS and D&C - 2024    Final Diagnosis:   A.  Endocervical polypectomy:  -Benign endocervical polyp with squamous metaplasia.  -Negative for dysplasia or malignancy.     B.  Endometrial polypectomy:  -Fragments of benign endometrial polyp.  -Background fragments of proliferative endometrium.  -Negative for hyperplasia or malignancy.     Chart and previous encounters reviewed.  HISTORY:  Past Medical History:    Gallbladder calculus    Mental disorder    anxiety    Visual impairment      Past Surgical History:   Procedure Laterality Date    Cholecystectomy  2015    Cyst removal Right 2010    D & c  2014      Family History   Problem Relation Age of Onset    Diabetes Father     Hypertension Father     Thyroid disease Father     Other (lung cancer) Father     No Known Problems Mother     Cancer Maternal Grandmother         tongue,throat    Stroke Maternal Grandmother     Diabetes Paternal Grandfather       Social History:   Social History     Socioeconomic History    Marital status:    Tobacco Use    Smoking status: Never    Smokeless tobacco: Never   Vaping Use    Vaping status: Never Used   Substance and Sexual Activity    Alcohol use: Yes    Drug use: No    Sexual activity: Yes     Partners: Male     Birth control/protection: Condom        Medications (Active  prior to today's visit):  Current Outpatient Medications   Medication Sig Dispense Refill    ibuprofen 600 MG Oral Tab Take 1 tablet (600 mg total) by mouth every 6 (six) hours as needed. (Patient not taking: Reported on 2024) 20 tablet 1    Cholecalciferol (VITAMIN D3) 1.25 MG (14676 UT) Oral Cap Take 1 capsule by mouth ONCE per week for 12 weeks (Patient not taking: Reported on 2024) 12 capsule 0       Allergies:  Allergies   Allergen Reactions    Adhesive Tape HIVES    Pcn [Penicillins] ANAPHYLAXIS       Gyn:  Menarche: 12  Period Cycle (Days): 28-30  Period Duration (Days): 8  Period Flow: heavy days 2-3  Use of Birth Control (if yes, specify type): Condoms  Pap Date: 23  Pap Result Notes: neg/neg 2021 NEG/NEG  Follow Up Recommendation:     OB Hx:  OB History    Para Term  AB Living   3 2 1 1 1 1   SAB IAB Ectopic Multiple Live Births   1     0 1      # Outcome Date GA Lbr Zach/2nd Weight Sex Type Anes PTL Lv   3 Term 17 41w3d 03:20 / 01:50 10 lb 3.1 oz (4.625 kg) F NORMAL SPONT EPI N KATI      Complications: Variable decelerations   2  08/20/15 22w3d 01:55 / 00:26 1 lb 5.3 oz (0.605 kg)  NORMAL SPONT None Y FD      Complications:  labor   1 SAB                  ROS:     10 point ROS completed and was negative, except for pertinent positive and negatives stated in the HPI.    PHYSICAL EXAM:   /76   Ht 66.5\"   Wt 255 lb (115.7 kg)   LMP 04/15/2024 (Approximate)   BMI 40.54 kg/m²      Wt Readings from Last 6 Encounters:   24 255 lb (115.7 kg)   24 282 lb (127.9 kg)   24 282 lb 3 oz (128 kg)   23 284 lb (128.8 kg)   10/10/22 268 lb (121.6 kg)   21 262 lb (118.8 kg)        Gen:  Oriented, in no acute distress    Will assess pelvic floor with next exam in        ASSESSMENT/PLAN:     1. Status post hysteroscopy -removal of endocervical polyp and endometrial polyp     2. Hx of menorrhagia    3. SHAQUILLE (stress  urinary incontinence, female)  - Pelvic Floor Therapy - [place] [facility]          Meds This Visit:  Requested Prescriptions      No prescriptions requested or ordered in this encounter       Imaging & Referrals:  PELVIC FLOOR THERAPY - INTERNAL     Return in about 6 months (around 11/2/2024) for Well Woman Exam.      David Sierra MD  5/2/2024  4:29 PM

## 2024-08-28 ENCOUNTER — TELEMEDICINE (OUTPATIENT)
Dept: TELEHEALTH | Age: 39
End: 2024-08-28
Payer: COMMERCIAL

## 2024-08-28 DIAGNOSIS — U07.1 COVID: ICD-10-CM

## 2024-08-28 DIAGNOSIS — U07.1 POSITIVE SELF-ADMINISTERED ANTIGEN TEST FOR COVID-19: Primary | ICD-10-CM

## 2024-08-28 PROCEDURE — 99212 OFFICE O/P EST SF 10 MIN: CPT | Performed by: PHYSICIAN ASSISTANT

## 2024-08-28 NOTE — PATIENT INSTRUCTIONS
Home Care:    Drink plenty of fluids.  Get adequate rest  You can take an over the counter pain reliever if needed.   You can take your preferred cough/cold medication over the counter as well.  You should monitor the ingredients to make sure you are not exceeding the recommended doses of acetaminophen or ibuprofen.  The following ingredients may be helpful.  Take according to package directions if no contraindication to use.      - Guaifenesin (mucinex) to thin mucus secretions;               - Cough drops or dextromethorphan (Delsym) as a cough suppressant              - A decongestant such as Sudafed or Afrin nasal spray (do not use Afrin for longer than 3 days).  If you have a history of high blood pressure you may want to switch to Coricidin HBP which does not affect the blood pressure.)               - Astepro Allergy over the counter nasal spray may help relieve nasal congestion             - Saline nasal spray/rinse to help relieve congestion             - Salt water gargles three times daily for sore throat.   Notify your primary care provider of any new or worsening symptoms.  If you have an emergency warning sign such as trouble breathing, chest pain, wheezing, or extreme weakness, seek emergency medical care immediately.  Take a probiotic daily if you have diarrhea      Please follow CDC guidelines listed below:     When you may have a respiratory virus, the CDC recommends the following: ?  Stay home and away from others (including people you live with who are not sick) if you have respiratory virus symptoms.     You can go back to your normal activities when, for at least 24 hours, both of these are true:   Your symptoms are getting better overall, and   You have not had a fever (and are not using fever-reducing medication)    When you go back to your normal activities, take added precaution over the next 5 days, including wearing a mask, hygiene, physical distancing, taking additional steps for   air, and/or testing when you will be around other people indoors.  Keep in mind that you may still be able to spread the virus that made you sick, even if you are feeling better. You are likely to be less contagious at this time, depending on factors like how long you were sick or how sick you were.  If you develop a fever or you start to feel worse after you have gone back to normal activities, contact your primary care provider.

## 2024-08-28 NOTE — PROGRESS NOTES
Virtual/Telephone Check-In    Sonja Nieves verbally consents to a Virtual/Telephone Check-In service on 08/28/24.  Patient has been referred to the [facility] website at [email or web address] to review the yearly Consent to Treat document.  Patient understands and accepts financial responsibility for any deductible, co-insurance and/or co-pays associated with this service.       CHIEF COMPLAINT:  No chief complaint on file.      HPI:  Sonja Nieves is a 39 year old female who presents for a video visit.  Patient reports tested positive for covid yesterday. Reports nasal congestion, sinus pressure, sore throat, low grade fever, chills, headache, cough.  Patient denies breathing issues.  Patient has tried Mucinex for symptoms, which has not seemed to help.    Current Outpatient Medications   Medication Sig Dispense Refill    nirmatrelvir-ritonavir 300-100 MG Oral Tablet Therapy Pack Take two nirmatrelvir tablets (300mg) with one ritonavir tablet (100mg) together twice daily for 5 days. 30 tablet 0    ibuprofen 600 MG Oral Tab Take 1 tablet (600 mg total) by mouth every 6 (six) hours as needed. (Patient not taking: Reported on 1/24/2024) 20 tablet 1    Cholecalciferol (VITAMIN D3) 1.25 MG (69004 UT) Oral Cap Take 1 capsule by mouth ONCE per week for 12 weeks (Patient not taking: Reported on 1/24/2024) 12 capsule 0     Past Medical History:    Gallbladder calculus    Mental disorder    anxiety    Visual impairment     Past Surgical History:   Procedure Laterality Date    Cholecystectomy  04/2015    Cyst removal Right 11/2010    D & c  12/2014       Social History     Socioeconomic History    Marital status:    Tobacco Use    Smoking status: Never    Smokeless tobacco: Never   Vaping Use    Vaping status: Never Used   Substance and Sexual Activity    Alcohol use: Yes    Drug use: No    Sexual activity: Yes     Partners: Male     Birth control/protection: Condom        REVIEW OF SYSTEMS:  GENERAL: decreased  appetite  SKIN: no rashes or abnormal skin lesions  HEENT: See HPI  LUNGS: denies shortness of breath or wheezing, See HPI  CARDIOVASCULAR: denies chest pain or palpitations   GI: denies N/V/C or abdominal pain  NEURO: + headaches    EXAM:  General: Alert, Ill-appearing/sounding, and In no acute distress  Respiratory:   Speaking in full sentences comfortably  Normal work of breathing  No cough during visit  Head: Normocephalic  Nose: No obvious nasal discharge.  Skin: No obvious rashes or lesions from what observed.     No results found for this or any previous visit (from the past 24 hour(s)).    ASSESSMENT AND PLAN:  Sonja Nieves is a 39 year old female who presents with symptoms that are consistent with    ASSESSMENT:   Encounter Diagnoses   Name Primary?    Positive self-administered antigen test for COVID-19 Yes    COVID        PLAN: The patient has been deemed a candidate for Paxlovid.  Symptoms began yesterday.  Patient does not require hospitalization.   Patient has the following risks factors BMI.    Labs reveal no significant history of liver or kidney problems/conditions.   Per [place] drug interaction : none    Discussed use, dose, and possible side effects.    The patient agrees to treatment with Paxlovid.   Advised to continue to self-isolate and use infection control measures according to CDC guidelines.    Meds as below.  See patient Instructions    Meds & Refills for this Visit:  Requested Prescriptions     Signed Prescriptions Disp Refills    nirmatrelvir-ritonavir 300-100 MG Oral Tablet Therapy Pack 30 tablet 0     Sig: Take two nirmatrelvir tablets (300mg) with one ritonavir tablet (100mg) together twice daily for 5 days.       Risks, benefits, and side effects of medication explained and discussed.    Patient Instructions   Home Care:    Drink plenty of fluids.  Get adequate rest  You can take an over the counter pain reliever if needed.   You can take your preferred cough/cold  medication over the counter as well.  You should monitor the ingredients to make sure you are not exceeding the recommended doses of acetaminophen or ibuprofen.  The following ingredients may be helpful.  Take according to package directions if no contraindication to use.      - Guaifenesin (mucinex) to thin mucus secretions;               - Cough drops or dextromethorphan (Delsym) as a cough suppressant              - A decongestant such as Sudafed or Afrin nasal spray (do not use Afrin for longer than 3 days).  If you have a history of high blood pressure you may want to switch to Coricidin HBP which does not affect the blood pressure.)               - Astepro Allergy over the counter nasal spray may help relieve nasal congestion             - Saline nasal spray/rinse to help relieve congestion             - Salt water gargles three times daily for sore throat.   Notify your primary care provider of any new or worsening symptoms.  If you have an emergency warning sign such as trouble breathing, chest pain, wheezing, or extreme weakness, seek emergency medical care immediately.  Take a probiotic daily if you have diarrhea      Please follow CDC guidelines listed below:     When you may have a respiratory virus, the CDC recommends the following: ?  Stay home and away from others (including people you live with who are not sick) if you have respiratory virus symptoms.     You can go back to your normal activities when, for at least 24 hours, both of these are true:   Your symptoms are getting better overall, and   You have not had a fever (and are not using fever-reducing medication)    When you go back to your normal activities, take added precaution over the next 5 days, including wearing a mask, hygiene, physical distancing, taking additional steps for  air, and/or testing when you will be around other people indoors.  Keep in mind that you may still be able to spread the virus that made you sick, even if  you are feeling better. You are likely to be less contagious at this time, depending on factors like how long you were sick or how sick you were.  If you develop a fever or you start to feel worse after you have gone back to normal activities, contact your primary care provider.          The patient indicates understanding of these issues and agrees to the plan.  The patient is asked to f/u with PCP if sx's persist or to ER if s/s worsen.    Sonja Nieves advised to follow CDC guidelines for self isolation and symptomatic treatment as outlined on CDC Patient Guidelines. Sonja Nieves understands phone evaluation is not a substitute for face-to-face examination or emergency care. Patient advised to go to ER or call 911 for worsening symptoms or acute distress.

## 2025-04-12 ENCOUNTER — WALK IN (OUTPATIENT)
Dept: URGENT CARE | Age: 40
End: 2025-04-12
Attending: PHYSICIAN ASSISTANT

## 2025-04-12 VITALS
DIASTOLIC BLOOD PRESSURE: 86 MMHG | OXYGEN SATURATION: 96 % | TEMPERATURE: 96.9 F | RESPIRATION RATE: 16 BRPM | HEART RATE: 78 BPM | SYSTOLIC BLOOD PRESSURE: 125 MMHG | WEIGHT: 270 LBS

## 2025-04-12 DIAGNOSIS — J01.40 ACUTE PANSINUSITIS, RECURRENCE NOT SPECIFIED: ICD-10-CM

## 2025-04-12 DIAGNOSIS — J40 BRONCHITIS WITH WHEEZING: Primary | ICD-10-CM

## 2025-04-12 RX ORDER — PREDNISONE 20 MG/1
20 TABLET ORAL DAILY
Qty: 5 TABLET | Refills: 0 | Status: SHIPPED | OUTPATIENT
Start: 2025-04-12 | End: 2025-04-17

## 2025-04-12 RX ORDER — ALBUTEROL SULFATE 90 UG/1
2 INHALANT RESPIRATORY (INHALATION) EVERY 4 HOURS PRN
Qty: 1 EACH | Refills: 0 | Status: SHIPPED | OUTPATIENT
Start: 2025-04-12 | End: 2025-04-19

## 2025-04-12 RX ORDER — FLUTICASONE PROPIONATE 50 MCG
2 SPRAY, SUSPENSION (ML) NASAL DAILY
Qty: 1 EACH | Refills: 0 | Status: SHIPPED | OUTPATIENT
Start: 2025-04-12 | End: 2025-05-12

## 2025-04-12 RX ORDER — AZITHROMYCIN 250 MG/1
TABLET, FILM COATED ORAL
Qty: 6 TABLET | Refills: 0 | Status: SHIPPED | OUTPATIENT
Start: 2025-04-12

## 2025-04-12 ASSESSMENT — ENCOUNTER SYMPTOMS
RHINORRHEA: 1
BRUISES/BLEEDS EASILY: 0
SORE THROAT: 0
DIZZINESS: 0
VOMITING: 0
COUGH: 1
SHORTNESS OF BREATH: 0
BACK PAIN: 0
SINUS PRESSURE: 1
WHEEZING: 1
CHILLS: 0
DIARRHEA: 0
HEADACHES: 0
SINUS PAIN: 1
FEVER: 0
ABDOMINAL PAIN: 0

## 2025-04-12 ASSESSMENT — PAIN SCALES - GENERAL
PAINLEVEL_OUTOF10: 0
PAINLEVEL_OUTOF10: 0

## 2025-04-15 ENCOUNTER — WALK IN (OUTPATIENT)
Dept: URGENT CARE | Age: 40
End: 2025-04-15
Attending: EMERGENCY MEDICINE

## 2025-04-15 VITALS
HEART RATE: 73 BPM | DIASTOLIC BLOOD PRESSURE: 82 MMHG | SYSTOLIC BLOOD PRESSURE: 122 MMHG | TEMPERATURE: 97.2 F | OXYGEN SATURATION: 96 % | RESPIRATION RATE: 16 BRPM

## 2025-04-15 DIAGNOSIS — R07.81 PLEURITIC CHEST PAIN: Primary | ICD-10-CM

## 2025-04-15 PROCEDURE — 99214 OFFICE O/P EST MOD 30 MIN: CPT

## 2025-04-15 ASSESSMENT — ENCOUNTER SYMPTOMS
WHEEZING: 1
DIZZINESS: 0
CHILLS: 0
VOMITING: 0
SHORTNESS OF BREATH: 0
DIARRHEA: 0
ABDOMINAL PAIN: 0
HEADACHES: 0
BACK PAIN: 0
BRUISES/BLEEDS EASILY: 0
SORE THROAT: 0
COUGH: 1
FEVER: 0

## 2025-04-15 ASSESSMENT — PAIN SCALES - GENERAL
PAINLEVEL_OUTOF10: 8
PAINLEVEL_OUTOF10: 8

## 2025-07-14 ENCOUNTER — OFFICE VISIT (OUTPATIENT)
Facility: CLINIC | Age: 40
End: 2025-07-14
Payer: COMMERCIAL

## 2025-07-14 VITALS — DIASTOLIC BLOOD PRESSURE: 70 MMHG | BODY MASS INDEX: 39 KG/M2 | WEIGHT: 246 LBS | SYSTOLIC BLOOD PRESSURE: 126 MMHG

## 2025-07-14 DIAGNOSIS — Z12.31 SCREENING MAMMOGRAM FOR BREAST CANCER: ICD-10-CM

## 2025-07-14 DIAGNOSIS — N95.1 PERIMENOPAUSAL SYMPTOMS: ICD-10-CM

## 2025-07-14 DIAGNOSIS — Z13.228 SCREENING FOR ENDOCRINE, METABOLIC, AND IMMUNITY DISORDER: ICD-10-CM

## 2025-07-14 DIAGNOSIS — R68.82 LOW LIBIDO: ICD-10-CM

## 2025-07-14 DIAGNOSIS — Z98.890 STATUS POST HYSTEROSCOPY: ICD-10-CM

## 2025-07-14 DIAGNOSIS — Z13.29 SCREENING FOR ENDOCRINE, METABOLIC, AND IMMUNITY DISORDER: ICD-10-CM

## 2025-07-14 DIAGNOSIS — Z01.419 WELL WOMAN EXAM WITH ROUTINE GYNECOLOGICAL EXAM: Primary | ICD-10-CM

## 2025-07-14 DIAGNOSIS — Z13.0 SCREENING FOR ENDOCRINE, METABOLIC, AND IMMUNITY DISORDER: ICD-10-CM

## 2025-07-14 DIAGNOSIS — N89.8 VAGINAL DRYNESS: ICD-10-CM

## 2025-07-14 DIAGNOSIS — Q51.3 BICORNATE UTERUS: ICD-10-CM

## 2025-07-14 RX ORDER — ALBUTEROL SULFATE 90 UG/1
2 INHALANT RESPIRATORY (INHALATION) AS NEEDED
COMMUNITY
Start: 2025-04-12

## 2025-07-14 NOTE — PROGRESS NOTES
GYN H&P     Genetic questionnaire reviewed with the patient and she will be referred for genetic counseling if the questionnaire had any positive results.    The [facility] intake form was also reviewed regarding contraception, menstrual periods, urinary health, and vaginal / sexual health    The patient was offered a medical chaperone during the visit    2025  1:43 PM    Chief Complaint   Patient presents with    Gynecologic Exam     Annual,   Pt is experiencing dryness, itchy hands, night sweats, and not as interested in sex.       HPI: Sonja is a 40 year old  Patient's last menstrual period was 2025 (approximate).  (contraception: condoms) here for her annual gyn exam.     Menses are regular but noting change in frequency and flow - her most recent period in  was much lighter than she is used to. Also reporting what she assumes are perimenopausal symptoms including low libido, vaginal dryness, feeling warm, mood liability/frustration and itchy hands/skin. Reports her mother went through menopause around age 52.      Denies any pelvic or breast complaints.  Satisfied with current contraception.     Previous encounters and chart reviewed.     OB History    Para Term  AB Living   3 2 1 1 1 1   SAB IAB Ectopic Multiple Live Births   1   0 1      # Outcome Date GA Lbr Zach/2nd Weight Sex Type Anes PTL Lv   3 Term 17 41w3d 03:20 / 01:50 10 lb 3.1 oz (4.625 kg) F NORMAL SPONT EPI N KATI      Complications: Variable decelerations   2  08/20/15 22w3d 01:55 / 00:26 1 lb 5.3 oz (0.605 kg)  NORMAL SPONT None Y FD      Complications:  labor   1 SAB                GYN hx:   Menarche: 12  Period Cycle (Days): 28-30  Period Duration (Days): 8  Period Flow: heavy days 2-3  Use of Birth Control (if yes, specify type): Condoms  Pap Date: 23  Pap Result Notes: neg/neg 2021 NEG/NEG  Follow Up Recommendation:       Past Medical History[1]  Past  Surgical History[2]  Allergies[3]  Medications Ordered Prior to Encounter[4]  Family History[5]  Social History     Socioeconomic History    Marital status:      Spouse name: Not on file    Number of children: Not on file    Years of education: Not on file    Highest education level: Not on file   Occupational History    Not on file   Tobacco Use    Smoking status: Never    Smokeless tobacco: Never   Vaping Use    Vaping status: Never Used   Substance and Sexual Activity    Alcohol use: Yes    Drug use: No    Sexual activity: Yes     Partners: Male     Birth control/protection: Condom   Other Topics Concern    Not on file   Social History Narrative    Not on file     Social Drivers of Health     Food Insecurity: No Food Insecurity (7/14/2025)    NCSS - Food Insecurity     Worried About Running Out of Food in the Last Year: No     Ran Out of Food in the Last Year: No   Transportation Needs: No Transportation Needs (7/14/2025)    NCSS - Transportation     Lack of Transportation: No   Stress: Not on file   Housing Stability: Not At Risk (7/14/2025)    NCSS - Housing/Utilities     Has Housing: Yes     Worried About Losing Housing: No     Unable to Get Utilities: No       ROS:     Review of Systems:  General: denies fevers, chills, fatigue and malaise.   Eyes: no visual changes, denies headaches  ENT: no complaints, denies earaches, runny nose, epistaxis, throat pain or sore throat  Respiratory: denies SOB, dyspnea, cough or wheezing  Cardiovascular: denies chest pain, palpitations, exercise intolerance   GI: denies abdominal pain, diarrhea, constipation  : no complaints, denies dysuria, increased urinary frequency. Menses irregular (see above), no dysmenorrhea, no menorrhagia, no dyspareunia, +vaginal dryness  Hematological/lymphatic: denies history of excessive bleeding or bruising, denies dizziness, lightheadedness.   Breast: denies rashes, skin changes, pain, lumps or discharge   Psychiatric: denies  depression, changes in sleep patterns, anxiety  Endocrine: denies hot or cold intolerance, mood changes   Neurological: denies changes in sight, smell, hearing or taste. Denies seizures or tremors  Immunological: denies allergies, denies anaphylaxis, or swollen lymph nodes  Musculoskeletal: denies joint pain, morning stiffness, decreased range of motion         O /70   Wt 246 lb (111.6 kg)   LMP 06/27/2025 (Approximate)   Breastfeeding No   BMI 39.11 kg/m²         Wt Readings from Last 6 Encounters:   07/14/25 246 lb (111.6 kg)   05/02/24 255 lb (115.7 kg)   01/24/24 282 lb (127.9 kg)   01/05/24 282 lb 3 oz (128 kg)   11/22/23 284 lb (128.8 kg)   10/10/22 268 lb (121.6 kg)     Exam:   GENERAL: well developed, well nourished, in no apparent distress, oriented.  SKIN: no rashes, no suspicious lesions  HEENT: normal  NECK: supple; no thyromegaly, no adenopathy  LUNGS: clear to auscultation  CARDIOVASCULAR: normal S1, S2, RRR  BREASTS: soft, nontender, no palpable masses or nodes, no nipple discharge, no skin changes, no axillary adenopathy  ABDOMEN: no scars,  soft, non distended; non tender, no masses  PELVIC: External Genitalia: Normal appearing, no lesions.    Vagina: normal pink mucosa, no lesions, normal clear discharge.    Bladder well supported.  No  anterior or posterior hernias    Cervix: multiparous, no lesions , No CMT     Uterus: AVAF, mobile, non tender, normal size    Adnexa: non tender, no masses, normal size    Rectal: deferred  EXTREMITIES:  non tender without edema           Patient counseled on:    Diet/exercise.      Self Breast Exams     Safe sex practices / and living environment     Vaccines:  Annual Flu  Pap: neg/neg - Year:  11/2023  GC/Chlamydia:  n/a  Mammogram:  1/2024, reordered   Dexa:  n/a  Colonoscopy / Cologuard:   n/a, start at age 45  Lipid / Cholesterol:  ordered     A/P: Patient is 40 year old female with no complaints. Here for well woman exam.     Meds This  Visit:    Requested Prescriptions      No prescriptions requested or ordered in this encounter       1. Well woman exam with routine gynecological exam    2. Screening mammogram for breast cancer  - [facility] PRINCE 2D+3D SCREENING BILAT (CPT=77067/55311); Future    3. Low libido  - FSH; Future    4. Status post hysteroscopy -removal of endocervical polyp and endometrial polyp 2024    5. Bicornate uterus    6. Screening for endocrine, metabolic, and immunity disorder  - Hemoglobin A1C; Future  - TSH W Reflex To Free T4; Future  - Vitamin D, 25-Hydroxy; Future  - CBC With Differential With Platelet; Future  - Comp Metabolic Panel (14); Future  - Lipid Panel; Future    7. Perimenopausal symptoms    8. Vaginal dryness    Counseled extensively on perimenopausal transition and potential tx options for pt's symptoms including local vaginal moisturizers/estrogen, HRT/OCPs, SSRIs, etc - pt opts to try vaginal moisturizers for now and think about her other options. Will call office back if she chooses low-dose OCPs    Lab work ordered    Return in about 1 year (around 7/14/2026) for Well Woman Exam.    Katie Cali, MUKUL   7/14/2025  1:43 PM       This note was created by Tablo voice recognition. Errors in content may be related to improper recognition by the system; efforts to review and correct have been done but errors may still exist. Please contact me with any questions.    Note to patient and family   The 21st Century Cures Act makes medical notes available to patients in the interest of transparency.  However, please be advised that this is a medical document.  It is intended as mnhy-va-tdde communication.  It is written in medical language which may contain abbreviations or verbiage that are technical and unfamiliar.  It may appear blunt or direct.  Medical documents are intended to carry relevant information, facts as evident, and the clinical opinion of the practitioner.           [1]   Past Medical History:    Gallbladder calculus    Mental disorder    anxiety    Visual impairment   [2]   Past Surgical History:  Procedure Laterality Date    Cholecystectomy  04/2015    Cyst removal Right 11/2010    D & c  12/2014   [3]   Allergies  Allergen Reactions    Adhesive Tape HIVES    Pcn [Penicillins] ANAPHYLAXIS   [4]   Current Outpatient Medications on File Prior to Visit   Medication Sig Dispense Refill    albuterol 108 (90 Base) MCG/ACT Inhalation Aero Soln Inhale 2 puffs into the lungs as needed.      ibuprofen 600 MG Oral Tab Take 1 tablet (600 mg total) by mouth every 6 (six) hours as needed. 20 tablet 1    Cholecalciferol (VITAMIN D3) 1.25 MG (21134 UT) Oral Cap Take 1 capsule by mouth ONCE per week for 12 weeks (Patient not taking: Reported on 1/24/2024) 12 capsule 0     No current facility-administered medications on file prior to visit.   [5]   Family History  Problem Relation Age of Onset    Diabetes Father     Hypertension Father     Thyroid disease Father     Other (lung cancer) Father     No Known Problems Mother     Cancer Maternal Grandmother         tongue,throat    Stroke Maternal Grandmother     Diabetes Paternal Grandfather

## 2025-07-30 ENCOUNTER — HOSPITAL ENCOUNTER (OUTPATIENT)
Dept: MAMMOGRAPHY | Age: 40
Discharge: HOME | End: 2025-07-30

## 2025-07-30 DIAGNOSIS — Z12.31 SCREENING MAMMOGRAM FOR BREAST CANCER: ICD-10-CM

## 2025-07-30 PROCEDURE — 77063 BREAST TOMOSYNTHESIS BI: CPT

## 2025-07-30 PROCEDURE — 77067 SCR MAMMO BI INCL CAD: CPT

## 2025-08-20 ENCOUNTER — HOSPITAL ENCOUNTER (OUTPATIENT)
Dept: MAMMOGRAPHY | Facility: HOSPITAL | Age: 40
Discharge: HOME | End: 2025-08-20

## 2025-08-20 DIAGNOSIS — R92.2 INCONCLUSIVE MAMMOGRAM: ICD-10-CM

## 2025-08-20 PROCEDURE — 77065 DX MAMMO INCL CAD UNI: CPT

## 2025-08-20 PROCEDURE — 77061 BREAST TOMOSYNTHESIS UNI: CPT

## 2025-08-23 ENCOUNTER — WALK IN (OUTPATIENT)
Dept: URGENT CARE | Age: 40
End: 2025-08-23
Attending: EMERGENCY MEDICINE

## 2025-08-23 VITALS
RESPIRATION RATE: 16 BRPM | TEMPERATURE: 98.1 F | DIASTOLIC BLOOD PRESSURE: 89 MMHG | OXYGEN SATURATION: 96 % | HEART RATE: 84 BPM | SYSTOLIC BLOOD PRESSURE: 133 MMHG | WEIGHT: 280 LBS

## 2025-08-23 DIAGNOSIS — J02.0 STREP THROAT: ICD-10-CM

## 2025-08-23 DIAGNOSIS — J02.9 SORE THROAT: Primary | ICD-10-CM

## 2025-08-23 DIAGNOSIS — J01.41 ACUTE RECURRENT PANSINUSITIS: ICD-10-CM

## 2025-08-23 LAB
FLUAV RNA RESP QL NAA+PROBE: NOT DETECTED
FLUBV RNA RESP QL NAA+PROBE: NOT DETECTED
RSV AG NPH QL IA.RAPID: NOT DETECTED
S PYO DNA THROAT QL NAA+PROBE: DETECTED
SARS-COV-2 RNA RESP QL NAA+PROBE: NOT DETECTED
TEST LOT EXPIRATION DATE: ABNORMAL
TEST LOT EXPIRATION DATE: NORMAL
TEST LOT NUMBER: ABNORMAL
TEST LOT NUMBER: NORMAL

## 2025-08-23 PROCEDURE — 87651 STREP A DNA AMP PROBE: CPT | Performed by: EMERGENCY MEDICINE

## 2025-08-23 PROCEDURE — 87637 SARSCOV2&INF A&B&RSV AMP PRB: CPT | Performed by: EMERGENCY MEDICINE

## 2025-08-23 RX ORDER — AZITHROMYCIN 500 MG/1
500 TABLET, FILM COATED ORAL DAILY
Qty: 5 TABLET | Refills: 0 | Status: SHIPPED | OUTPATIENT
Start: 2025-08-23 | End: 2025-08-28

## 2025-08-23 RX ORDER — PREDNISONE 20 MG/1
20 TABLET ORAL DAILY
Qty: 5 TABLET | Refills: 0 | Status: SHIPPED | OUTPATIENT
Start: 2025-08-23 | End: 2025-08-28

## 2025-08-23 RX ORDER — FLUTICASONE PROPIONATE 50 MCG
2 SPRAY, SUSPENSION (ML) NASAL DAILY
Qty: 1 EACH | Refills: 0 | Status: SHIPPED | OUTPATIENT
Start: 2025-08-23 | End: 2025-09-22

## 2025-08-23 ASSESSMENT — ENCOUNTER SYMPTOMS
BRUISES/BLEEDS EASILY: 0
FEVER: 0
COUGH: 0
SORE THROAT: 1
DIARRHEA: 0
VOMITING: 0
DIZZINESS: 0
HEADACHES: 0
SINUS PRESSURE: 1
SHORTNESS OF BREATH: 0
CHILLS: 0
SINUS PAIN: 1
BACK PAIN: 0
RHINORRHEA: 1
ABDOMINAL PAIN: 0

## 2025-08-23 ASSESSMENT — PAIN SCALES - GENERAL
PAINLEVEL_OUTOF10: 6
PAINLEVEL_OUTOF10: 6

## (undated) DEVICE — SLEEVE COMPR M KNEE LEN SGL USE KENDALL SCD

## (undated) DEVICE — SOLUTION IRRIG 3000ML 0.9% NACL FLX CONT

## (undated) DEVICE — GYN CDS: Brand: MEDLINE INDUSTRIES, INC.

## (undated) DEVICE — PREMIUM WET SKIN PREP TRAY: Brand: MEDLINE INDUSTRIES, INC.

## (undated) DEVICE — SET CYSTO IRRIG L77IN DIA0.241IN BLDR NVENT

## (undated) DEVICE — STERILE SYNTHETIC POLYISOPRENE POWDER-FREE SURGICAL GLOVES WITH HYDROGEL COATING: Brand: PROTEXIS

## (undated) DEVICE — NEEDLE SPNL 22GA L3.5IN BLK HUB SS RW FIT

## (undated) DEVICE — SOLUTION IRRIG 1000ML 0.9% NACL USP BTL

## (undated) NOTE — MR AVS SNAPSHOT
[facility]  [address] [facility] [address]  [phone]               Thank you for choosing us for your health care visit with [facility].   We are glad to serve you and happy to provide you with this summary [employer] will allow you to access patient instructions from your recent visit,  view other health information, and more. To sign up or find more information, go to [email or web address] and click on the Sign Up Now link in the [employer] box.      Enter 2 ½ hours per week – spread out over time Use a viviana to keep you motivated   Don’t forget strength training with weights and resistance Set goals and track your progress   You don’t need to join a gym. Home exercises work great.  Put more priority on exe

## (undated) NOTE — LETTER
Date: 8/28/2024    Patient Name: Sonja Nieves          To Whom it may concern:    This letter has been written at the patient's request. The above patient was seen through telehealth encounter at [facility] for evaluation and treatment of a medical condition.    This patient should be excused from attending work from Thursday 8/29/24 through Friday 8/30/24.    The patient may return to work on next scheduled day with no limitations.        Sincerely,      YEIMY Billings, PARadhaC  Walk In Clinic/Telemedicine Physician Assistant  [facility]